# Patient Record
Sex: FEMALE | URBAN - METROPOLITAN AREA
[De-identification: names, ages, dates, MRNs, and addresses within clinical notes are randomized per-mention and may not be internally consistent; named-entity substitution may affect disease eponyms.]

---

## 2018-02-27 PROBLEM — Z00.00 ENCOUNTER FOR PREVENTIVE HEALTH EXAMINATION: Status: ACTIVE | Noted: 2018-02-27

## 2018-03-20 ENCOUNTER — OUTPATIENT (OUTPATIENT)
Dept: OUTPATIENT SERVICES | Facility: HOSPITAL | Age: 28
LOS: 1 days | Discharge: HOME | End: 2018-03-20

## 2018-03-20 ENCOUNTER — APPOINTMENT (OUTPATIENT)
Dept: OBGYN | Facility: CLINIC | Age: 28
End: 2018-03-20
Payer: COMMERCIAL

## 2018-03-20 VITALS — HEIGHT: 63 IN | BODY MASS INDEX: 26.58 KG/M2 | WEIGHT: 150 LBS

## 2018-03-20 DIAGNOSIS — N94.6 DYSMENORRHEA, UNSPECIFIED: ICD-10-CM

## 2018-03-20 PROCEDURE — 81003 URINALYSIS AUTO W/O SCOPE: CPT | Mod: QW

## 2018-03-20 PROCEDURE — 99214 OFFICE O/P EST MOD 30 MIN: CPT

## 2018-03-21 LAB
BILIRUB UR QL STRIP: NORMAL
GLUCOSE UR-MCNC: NORMAL
HCG UR QL: NORMAL EU/DL
HGB UR QL STRIP.AUTO: NORMAL
KETONES UR-MCNC: NORMAL
LEUKOCYTE ESTERASE UR QL STRIP: NORMAL
NITRITE UR QL STRIP: NORMAL
PH UR STRIP: 7
PROT UR STRIP-MCNC: NORMAL
SP GR UR STRIP: 1

## 2018-03-26 DIAGNOSIS — R10.2 PELVIC AND PERINEAL PAIN: ICD-10-CM

## 2018-04-05 ENCOUNTER — APPOINTMENT (OUTPATIENT)
Dept: OBGYN | Facility: CLINIC | Age: 28
End: 2018-04-05

## 2018-10-24 ENCOUNTER — OUTPATIENT (OUTPATIENT)
Dept: OUTPATIENT SERVICES | Facility: HOSPITAL | Age: 28
LOS: 1 days | Discharge: HOME | End: 2018-10-24

## 2018-10-24 ENCOUNTER — APPOINTMENT (OUTPATIENT)
Dept: OBGYN | Facility: CLINIC | Age: 28
End: 2018-10-24
Payer: COMMERCIAL

## 2018-10-24 VITALS — SYSTOLIC BLOOD PRESSURE: 110 MMHG | BODY MASS INDEX: 26.04 KG/M2 | DIASTOLIC BLOOD PRESSURE: 80 MMHG | WEIGHT: 147 LBS

## 2018-10-24 DIAGNOSIS — Z20.2 CONTACT WITH AND (SUSPECTED) EXPOSURE TO INFECTIONS WITH A PREDOMINANTLY SEXUAL MODE OF TRANSMISSION: ICD-10-CM

## 2018-10-24 DIAGNOSIS — N76.0 ACUTE VAGINITIS: ICD-10-CM

## 2018-10-24 PROCEDURE — 99213 OFFICE O/P EST LOW 20 MIN: CPT

## 2018-10-24 PROCEDURE — 87075 CULTR BACTERIA EXCEPT BLOOD: CPT

## 2018-10-30 LAB
A VAGINAE DNA VAG QL NAA+PROBE: ABNORMAL
BVAB2 DNA VAG QL NAA+PROBE: ABNORMAL
C KRUSEI DNA VAG QL NAA+PROBE: NEGATIVE
C TRACH RRNA SPEC QL NAA+PROBE: NEGATIVE
MEGA1 DNA VAG QL NAA+PROBE: ABNORMAL
N GONORRHOEA RRNA SPEC QL NAA+PROBE: NEGATIVE
T VAGINALIS RRNA SPEC QL NAA+PROBE: NEGATIVE

## 2018-11-11 ENCOUNTER — EMERGENCY (EMERGENCY)
Facility: HOSPITAL | Age: 28
LOS: 0 days | Discharge: HOME | End: 2018-11-11
Attending: EMERGENCY MEDICINE

## 2018-11-11 VITALS
RESPIRATION RATE: 18 BRPM | OXYGEN SATURATION: 100 % | DIASTOLIC BLOOD PRESSURE: 81 MMHG | WEIGHT: 149.91 LBS | SYSTOLIC BLOOD PRESSURE: 144 MMHG | HEIGHT: 63 IN | HEART RATE: 96 BPM | TEMPERATURE: 98 F

## 2018-11-11 DIAGNOSIS — R11.0 NAUSEA: ICD-10-CM

## 2018-11-11 DIAGNOSIS — R10.9 UNSPECIFIED ABDOMINAL PAIN: ICD-10-CM

## 2018-11-11 DIAGNOSIS — N83.209 UNSPECIFIED OVARIAN CYST, UNSPECIFIED SIDE: ICD-10-CM

## 2018-11-12 VITALS
RESPIRATION RATE: 20 BRPM | HEART RATE: 79 BPM | DIASTOLIC BLOOD PRESSURE: 65 MMHG | TEMPERATURE: 97 F | SYSTOLIC BLOOD PRESSURE: 117 MMHG | OXYGEN SATURATION: 100 %

## 2018-11-12 LAB
ALBUMIN SERPL ELPH-MCNC: 4.1 G/DL — SIGNIFICANT CHANGE UP (ref 3.5–5.2)
ALP SERPL-CCNC: 60 U/L — SIGNIFICANT CHANGE UP (ref 30–115)
ALT FLD-CCNC: 8 U/L — SIGNIFICANT CHANGE UP (ref 0–41)
ANION GAP SERPL CALC-SCNC: 18 MMOL/L — HIGH (ref 7–14)
APPEARANCE UR: ABNORMAL
AST SERPL-CCNC: 16 U/L — SIGNIFICANT CHANGE UP (ref 0–41)
BASOPHILS # BLD AUTO: 0.04 K/UL — SIGNIFICANT CHANGE UP (ref 0–0.2)
BASOPHILS NFR BLD AUTO: 0.3 % — SIGNIFICANT CHANGE UP (ref 0–1)
BILIRUB DIRECT SERPL-MCNC: <0.2 MG/DL — SIGNIFICANT CHANGE UP (ref 0–0.2)
BILIRUB INDIRECT FLD-MCNC: >0.3 MG/DL — SIGNIFICANT CHANGE UP (ref 0.2–1.2)
BILIRUB SERPL-MCNC: 0.5 MG/DL — SIGNIFICANT CHANGE UP (ref 0.2–1.2)
BILIRUB UR-MCNC: NEGATIVE — SIGNIFICANT CHANGE UP
BUN SERPL-MCNC: 11 MG/DL — SIGNIFICANT CHANGE UP (ref 10–20)
CALCIUM SERPL-MCNC: 9.8 MG/DL — SIGNIFICANT CHANGE UP (ref 8.5–10.1)
CHLORIDE SERPL-SCNC: 100 MMOL/L — SIGNIFICANT CHANGE UP (ref 98–110)
CO2 SERPL-SCNC: 21 MMOL/L — SIGNIFICANT CHANGE UP (ref 17–32)
COLOR SPEC: YELLOW — SIGNIFICANT CHANGE UP
CREAT SERPL-MCNC: 0.7 MG/DL — SIGNIFICANT CHANGE UP (ref 0.7–1.5)
DIFF PNL FLD: NEGATIVE — SIGNIFICANT CHANGE UP
EOSINOPHIL # BLD AUTO: 0.11 K/UL — SIGNIFICANT CHANGE UP (ref 0–0.7)
EOSINOPHIL NFR BLD AUTO: 0.9 % — SIGNIFICANT CHANGE UP (ref 0–8)
EPI CELLS # UR: ABNORMAL /HPF
GLUCOSE SERPL-MCNC: 100 MG/DL — HIGH (ref 70–99)
GLUCOSE UR QL: NEGATIVE MG/DL — SIGNIFICANT CHANGE UP
HCG SERPL QL: NEGATIVE — SIGNIFICANT CHANGE UP
HCT VFR BLD CALC: 37.3 % — SIGNIFICANT CHANGE UP (ref 37–47)
HGB BLD-MCNC: 12 G/DL — SIGNIFICANT CHANGE UP (ref 12–16)
IMM GRANULOCYTES NFR BLD AUTO: 0.3 % — SIGNIFICANT CHANGE UP (ref 0.1–0.3)
KETONES UR-MCNC: 40
LEUKOCYTE ESTERASE UR-ACNC: NEGATIVE — SIGNIFICANT CHANGE UP
LIDOCAIN IGE QN: 19 U/L — SIGNIFICANT CHANGE UP (ref 7–60)
LYMPHOCYTES # BLD AUTO: 1.8 K/UL — SIGNIFICANT CHANGE UP (ref 1.2–3.4)
LYMPHOCYTES # BLD AUTO: 14.7 % — LOW (ref 20.5–51.1)
MCHC RBC-ENTMCNC: 26.6 PG — LOW (ref 27–31)
MCHC RBC-ENTMCNC: 32.2 G/DL — SIGNIFICANT CHANGE UP (ref 32–37)
MCV RBC AUTO: 82.7 FL — SIGNIFICANT CHANGE UP (ref 81–99)
MONOCYTES # BLD AUTO: 0.45 K/UL — SIGNIFICANT CHANGE UP (ref 0.1–0.6)
MONOCYTES NFR BLD AUTO: 3.7 % — SIGNIFICANT CHANGE UP (ref 1.7–9.3)
NEUTROPHILS # BLD AUTO: 9.82 K/UL — HIGH (ref 1.4–6.5)
NEUTROPHILS NFR BLD AUTO: 80.1 % — HIGH (ref 42.2–75.2)
NITRITE UR-MCNC: NEGATIVE — SIGNIFICANT CHANGE UP
NRBC # BLD: 0 /100 WBCS — SIGNIFICANT CHANGE UP (ref 0–0)
PH UR: 5.5 — SIGNIFICANT CHANGE UP (ref 5–8)
PLATELET # BLD AUTO: 244 K/UL — SIGNIFICANT CHANGE UP (ref 130–400)
POTASSIUM SERPL-MCNC: 4.3 MMOL/L — SIGNIFICANT CHANGE UP (ref 3.5–5)
POTASSIUM SERPL-SCNC: 4.3 MMOL/L — SIGNIFICANT CHANGE UP (ref 3.5–5)
PROT SERPL-MCNC: 7.6 G/DL — SIGNIFICANT CHANGE UP (ref 6–8)
PROT UR-MCNC: NEGATIVE MG/DL — SIGNIFICANT CHANGE UP
RBC # BLD: 4.51 M/UL — SIGNIFICANT CHANGE UP (ref 4.2–5.4)
RBC # FLD: 13.9 % — SIGNIFICANT CHANGE UP (ref 11.5–14.5)
SODIUM SERPL-SCNC: 139 MMOL/L — SIGNIFICANT CHANGE UP (ref 135–146)
SP GR SPEC: 1.02 — SIGNIFICANT CHANGE UP (ref 1.01–1.03)
UROBILINOGEN FLD QL: 0.2 MG/DL — SIGNIFICANT CHANGE UP (ref 0.2–0.2)
WBC # BLD: 12.26 K/UL — HIGH (ref 4.8–10.8)
WBC # FLD AUTO: 12.26 K/UL — HIGH (ref 4.8–10.8)

## 2018-11-12 RX ORDER — MORPHINE SULFATE 50 MG/1
4 CAPSULE, EXTENDED RELEASE ORAL ONCE
Qty: 0 | Refills: 0 | Status: DISCONTINUED | OUTPATIENT
Start: 2018-11-12 | End: 2018-11-12

## 2018-11-12 RX ORDER — SODIUM CHLORIDE 9 MG/ML
1000 INJECTION INTRAMUSCULAR; INTRAVENOUS; SUBCUTANEOUS ONCE
Qty: 0 | Refills: 0 | Status: COMPLETED | OUTPATIENT
Start: 2018-11-12 | End: 2018-11-12

## 2018-11-12 RX ORDER — NORETHINDRONE 0.35 MG/1
1 TABLET ORAL
Qty: 14 | Refills: 0
Start: 2018-11-12 | End: 2018-11-25

## 2018-11-12 RX ORDER — IOHEXOL 300 MG/ML
30 INJECTION, SOLUTION INTRAVENOUS ONCE
Qty: 0 | Refills: 0 | Status: COMPLETED | OUTPATIENT
Start: 2018-11-12 | End: 2018-11-12

## 2018-11-12 RX ADMIN — SODIUM CHLORIDE 1000 MILLILITER(S): 9 INJECTION INTRAMUSCULAR; INTRAVENOUS; SUBCUTANEOUS at 01:05

## 2018-11-12 RX ADMIN — MORPHINE SULFATE 4 MILLIGRAM(S): 50 CAPSULE, EXTENDED RELEASE ORAL at 02:05

## 2018-11-12 RX ADMIN — SODIUM CHLORIDE 1000 MILLILITER(S): 9 INJECTION INTRAMUSCULAR; INTRAVENOUS; SUBCUTANEOUS at 02:05

## 2018-11-12 RX ADMIN — IOHEXOL 30 MILLILITER(S): 300 INJECTION, SOLUTION INTRAVENOUS at 03:13

## 2018-11-12 RX ADMIN — MORPHINE SULFATE 4 MILLIGRAM(S): 50 CAPSULE, EXTENDED RELEASE ORAL at 05:39

## 2018-11-12 NOTE — ED PROVIDER NOTE - OBJECTIVE STATEMENT
patient is c/o abdominal pain, generalized lower abd pain since 9.30 pm, associated with nausea, denies any f/c; denies any urinary symptoms, no vaginal bleeding, no vaginal d/c, patient with h/o endometriosis, sexually active, last sexual intercourse was 3 wks ago, LMP one week ago.

## 2018-11-12 NOTE — CONSULT NOTE ADULT - ASSESSMENT
26 yo G0, fundal fibroid, small ovarian cysts, presumed endometriosis with LLQ pain now resolved, unlikely gyn in origin.  recc percocet PRN(pt unable to take NSAIDs due to allergic reaction)  aygestin 5mg qD  follow up with Dr. Richards as scheduled  disposition per ED    Will inform Attending on call 26 yo G0, fundal fibroid, small ovarian cysts, presumed endometriosis with LLQ pain now resolved, unlikely gyn in origin.  recc percocet PRN(pt unable to take NSAIDs due to allergic reaction)  recc aygestin 5mg qD  follow up with Dr. Richards as scheduled  disposition per ED    Will inform Attending on call

## 2018-11-12 NOTE — ED ADULT NURSE NOTE - NSIMPLEMENTINTERV_GEN_ALL_ED
Implemented All Universal Safety Interventions:  [place] to call system. Call bell, personal items and telephone within reach. Instruct patient to call for assistance. Room bathroom lighting operational. Non-slip footwear when patient is off stretcher. Physically safe environment: no spills, clutter or unnecessary equipment. Stretcher in lowest position, wheels locked, appropriate side rails in place.

## 2018-11-12 NOTE — ED PROVIDER NOTE - PROGRESS NOTE DETAILS
patient remained stable in ED, improved well. Patient states that her pain is much better, states is feeling lot better. Patient was evaluated by OBGYN, as recommended, prescription is sent to pharmacy. Discussed with patient about the OBGYN recommendations of pain medications, patient preferred to take tylenol for pain, and will f/u with her OBGYN doctor. Patient states is feeling lot better, states is hungry and want to eat and want to go home. Patient is awake, alert, o x 3, ambulatory comfortable, tolerated PO. Discussed with patient in detail about his clinical condition, results of the diagnostic studies and the need for close out-patient follow up. Detail aftercare instructions and return precautions are given.

## 2018-11-12 NOTE — ED ADULT NURSE NOTE - CHPI ED NUR SYMPTOMS NEG
no blood in stool/no burning urination/no chills/no abdominal distension/no diarrhea/no dysuria/no hematuria/no nausea/no vomiting

## 2018-11-12 NOTE — CONSULT NOTE ADULT - SUBJECTIVE AND OBJECTIVE BOX
ROMEO ESPINAL  27y  Female [id number]    HPI: 28 yo G0, LMP 10/5 presents to ED with worsening lower abdominal pain starting yesterday at 2145. Reports that pain is mostly on the left, sharp, constant, 9/10 in intensity initially, lasted for 2.5 hours. Received morphine x 2 doses. Pain is now 2/10 in intensity. Denies fever/chills, nausea/vomiting, diarrhea/constipation, dysuria, vaginal discharge. Pt has known history of fibroid uterus and ovarian cysts. Pt also has presumed endometriosis and will have a diagnostic laparoscopy with her OBGYN Dr. Richards.     Her endometriosis pain is q23 days, starting a day or two prior to her period. Also has dyschezia and dyspareunia.     PAST MEDICAL & SURGICAL HISTORY:  none      OB/GYN HISTORY: h/o presumed endometriosis, regular periods, q month, ovarian cysts-conservative management, denies h/o STIs, abnormal pap smears                                          FAMILY HISTORY: colon ca in Grandmother    Allergies: NSAID induced asthma      SOCIAL HISTORY: denies    REVIEW OF SYSTEMS  Denies the following: constitutional symptoms, visual symptoms, cardiovascular symptoms, respiratory symptoms, GI symptoms, musculoskeletal symptoms, skin symptoms, neurologic symptoms, hematologic symptoms, allergic symptoms, psychiatric symptoms  Except any pertinent positives listed.     Vital Signs Last 24 Hrs  T(C): 36.7 (2018 23:31), Max: 36.7 (2018 23:31)  T(F): 98.1 (2018 23:31), Max: 98.1 (2018 23:31)  HR: 96 (2018 23:31) (96 - 96)  BP: 144/81 (2018 23:31) (144/81 - 144/81)  BP(mean): --  RR: 18 (2018 23:31) (18 - 18)  SpO2: 100% (2018 23:31) (100% - 100%)      PHYSICAL EXAM:  GEN: NAD  Heart:RRR  Lungs:CTAB  ABd: NT, soft, no r/g/r  SVE: no CMT, retroverted uterus, nontender, no adnexal tenderness or masses b/l      LABS:    Pregnancy Test: negative as per Dr. Hsieh                        12.0    )-----------( 244      ( 2018 00:13 )             37.3         139  |  100  |  11  ----------------------------<  100<H>  4.3   |  21  |  0.7    Ca    9.8      2018 00:13    TPro  7.6  /  Alb  4.1  /  TBili  0.5  /  DBili  <0.2  /  AST  16  /  ALT  8   /  AlkPhos  60      I&O's Detail      Urinalysis Basic - ( 2018 00:19 )    Color: Yellow / Appearance: Cloudy / S.025 / pH: x  Gluc: x / Ketone: 40  / Bili: Negative / Urobili: 0.2 mg/dL   Blood: x / Protein: Negative mg/dL / Nitrite: Negative   Leuk Esterase: Negative / RBC: x / WBC x   Sq Epi: x / Non Sq Epi: Many /HPF / Bacteria: x      RADIOLOGY & ADDITIONAL STUDIES:  < from: CT Abdomen and Pelvis w/ Oral Cont and w/ IV Cont (18 @ 05:20) >    LOWER CHEST: Bibasilar subsegmental atelectasis..    HEPATOBILIARY: Scattered coarse hepatic calcifications, unchanged. No   intrahepatic or extrahepatic biliary ductal dilation.    SPLEEN: Unremarkable.    PANCREAS: Unremarkable.    ADRENAL GLANDS: Unremarkable.    KIDNEYS: Symmetric renal enhancement. No hydronephrosis.    ABDOMINOPELVIC NODES: Unremarkable.    PELVIC ORGANS: Retroverted uterus. Small uterine fibroids, better   evaluated on recent ultrasound.    PERITONEUM/MESENTERY/BOWEL: Sigmoid colon diverticulosis. The appendix is   not discretely visualized, however there are no right lower quadrant   inflammatory changes. No bowel obstruction. No pneumoperitoneum or   ascites.    BONES/SOFT TISSUES: Unremarkable.    OTHER: Normal caliber aorta.      IMPRESSION:       No acute intra-abdominal pathology.    < end of copied text >    < from: US Pelvis Complete (18 @ 01:14) >  LMP:   2018     FINDINGS:    UTERUS: Anteverted measuring 11 x 3.9 x 6.4 cm with an exophytic   broad-based predominantly subserosal fibroid arising from the dome. It   measures approximately 4.2 x 3.4 x 4.4 cm. The endometrial echo complex   measures 0.6 cm, which is normal in thickness.     RIGHT OVARY: measures 3.2 x 2.8 x 2.4 cm, and contains several cysts   measuring up to 2.1 cm. Doppler flow is demonstrated to the right ovary.     LEFT OVARY: measures 3.3 x 1.5 x 1.4 cm, andcontains a complicated cyst   measuring up to 1.8 cm. Doppler flow is demonstrated to the left ovary.     OTHER: No free fluid in the pelvis.    IMPRESSION:    Bilateral ovarian cysts/dominant follicles measuring up to 2.1 cm.    Fibroid uterus        < end of copied text > ROMEO ESPINAL  27y  Female [id number]    HPI: 26 yo G0, LMP 11/5 presents to ED with worsening lower abdominal pain starting yesterday at 2145. Reports that pain is mostly on the left, sharp, constant, 9/10 in intensity initially, lasted for 2.5 hours. Received morphine x 2 doses. Pain is now 2/10 in intensity. Denies fever/chills, nausea/vomiting, diarrhea/constipation, dysuria, vaginal discharge. Pt has known history of fibroid uterus and ovarian cysts. Pt also has presumed endometriosis and will have a diagnostic laparoscopy with her OBGYN Dr. Richards.     Her endometriosis pain is q23 days, starting a day or two prior to her period. Also has dyschezia and dyspareunia.     PAST MEDICAL & SURGICAL HISTORY:  none      OB/GYN HISTORY: h/o presumed endometriosis, regular periods, q month, ovarian cysts-conservative management, denies h/o STIs, abnormal pap smears                                          FAMILY HISTORY: colon ca in Grandmother    Allergies: NSAID induced asthma      SOCIAL HISTORY: denies    REVIEW OF SYSTEMS  Denies the following: constitutional symptoms, visual symptoms, cardiovascular symptoms, respiratory symptoms, GI symptoms, musculoskeletal symptoms, skin symptoms, neurologic symptoms, hematologic symptoms, allergic symptoms, psychiatric symptoms  Except any pertinent positives listed.     Vital Signs Last 24 Hrs  T(C): 36.7 (2018 23:31), Max: 36.7 (2018 23:31)  T(F): 98.1 (2018 23:31), Max: 98.1 (2018 23:31)  HR: 96 (2018 23:31) (96 - 96)  BP: 144/81 (2018 23:31) (144/81 - 144/81)  BP(mean): --  RR: 18 (2018 23:31) (18 - 18)  SpO2: 100% (2018 23:31) (100% - 100%)      PHYSICAL EXAM:  GEN: NAD  Heart:RRR  Lungs:CTAB  ABd: NT, soft, no r/g/r  SVE: no CMT, retroverted uterus, nontender, no adnexal tenderness or masses b/l      LABS:    Pregnancy Test: negative as per Dr. Hsieh                        12.0    )-----------( 244      ( 2018 00:13 )             37.3         139  |  100  |  11  ----------------------------<  100<H>  4.3   |  21  |  0.7    Ca    9.8      2018 00:13    TPro  7.6  /  Alb  4.1  /  TBili  0.5  /  DBili  <0.2  /  AST  16  /  ALT  8   /  AlkPhos  60      I&O's Detail      Urinalysis Basic - ( 2018 00:19 )    Color: Yellow / Appearance: Cloudy / S.025 / pH: x  Gluc: x / Ketone: 40  / Bili: Negative / Urobili: 0.2 mg/dL   Blood: x / Protein: Negative mg/dL / Nitrite: Negative   Leuk Esterase: Negative / RBC: x / WBC x   Sq Epi: x / Non Sq Epi: Many /HPF / Bacteria: x      RADIOLOGY & ADDITIONAL STUDIES:  < from: CT Abdomen and Pelvis w/ Oral Cont and w/ IV Cont (18 @ 05:20) >    LOWER CHEST: Bibasilar subsegmental atelectasis..    HEPATOBILIARY: Scattered coarse hepatic calcifications, unchanged. No   intrahepatic or extrahepatic biliary ductal dilation.    SPLEEN: Unremarkable.    PANCREAS: Unremarkable.    ADRENAL GLANDS: Unremarkable.    KIDNEYS: Symmetric renal enhancement. No hydronephrosis.    ABDOMINOPELVIC NODES: Unremarkable.    PELVIC ORGANS: Retroverted uterus. Small uterine fibroids, better   evaluated on recent ultrasound.    PERITONEUM/MESENTERY/BOWEL: Sigmoid colon diverticulosis. The appendix is   not discretely visualized, however there are no right lower quadrant   inflammatory changes. No bowel obstruction. No pneumoperitoneum or   ascites.    BONES/SOFT TISSUES: Unremarkable.    OTHER: Normal caliber aorta.      IMPRESSION:       No acute intra-abdominal pathology.    < end of copied text >    < from: US Pelvis Complete (18 @ 01:14) >  LMP:   2018     FINDINGS:    UTERUS: Anteverted measuring 11 x 3.9 x 6.4 cm with an exophytic   broad-based predominantly subserosal fibroid arising from the dome. It   measures approximately 4.2 x 3.4 x 4.4 cm. The endometrial echo complex   measures 0.6 cm, which is normal in thickness.     RIGHT OVARY: measures 3.2 x 2.8 x 2.4 cm, and contains several cysts   measuring up to 2.1 cm. Doppler flow is demonstrated to the right ovary.     LEFT OVARY: measures 3.3 x 1.5 x 1.4 cm, andcontains a complicated cyst   measuring up to 1.8 cm. Doppler flow is demonstrated to the left ovary.     OTHER: No free fluid in the pelvis.    IMPRESSION:    Bilateral ovarian cysts/dominant follicles measuring up to 2.1 cm.    Fibroid uterus        < end of copied text >

## 2018-11-13 LAB
CULTURE RESULTS: NO GROWTH — SIGNIFICANT CHANGE UP
SPECIMEN SOURCE: SIGNIFICANT CHANGE UP

## 2018-12-13 ENCOUNTER — OUTPATIENT (OUTPATIENT)
Dept: OUTPATIENT SERVICES | Facility: HOSPITAL | Age: 28
LOS: 1 days | Discharge: HOME | End: 2018-12-13

## 2018-12-13 ENCOUNTER — APPOINTMENT (OUTPATIENT)
Dept: OBGYN | Facility: CLINIC | Age: 28
End: 2018-12-13
Payer: COMMERCIAL

## 2018-12-13 VITALS — WEIGHT: 144 LBS | SYSTOLIC BLOOD PRESSURE: 100 MMHG | DIASTOLIC BLOOD PRESSURE: 70 MMHG | BODY MASS INDEX: 25.51 KG/M2

## 2018-12-13 DIAGNOSIS — J45.909 UNSPECIFIED ASTHMA, UNCOMPLICATED: ICD-10-CM

## 2018-12-13 DIAGNOSIS — N89.8 OTHER SPECIFIED NONINFLAMMATORY DISORDERS OF VAGINA: ICD-10-CM

## 2018-12-13 DIAGNOSIS — R10.2 PELVIC AND PERINEAL PAIN: ICD-10-CM

## 2018-12-13 LAB
BILIRUB UR QL STRIP: NORMAL
GLUCOSE UR-MCNC: NORMAL
HCG UR QL: 0.2 EU/DL
HGB UR QL STRIP.AUTO: NORMAL
KETONES UR-MCNC: NORMAL
LEUKOCYTE ESTERASE UR QL STRIP: NORMAL
NITRITE UR QL STRIP: NORMAL
PH UR STRIP: 7
PROT UR STRIP-MCNC: NORMAL
SP GR UR STRIP: 1.02

## 2018-12-13 PROCEDURE — 81003 URINALYSIS AUTO W/O SCOPE: CPT | Mod: QW

## 2018-12-13 PROCEDURE — 99213 OFFICE O/P EST LOW 20 MIN: CPT | Mod: 25

## 2018-12-13 PROCEDURE — 87075 CULTR BACTERIA EXCEPT BLOOD: CPT

## 2018-12-24 ENCOUNTER — RESULT REVIEW (OUTPATIENT)
Age: 28
End: 2018-12-24

## 2018-12-24 LAB
A VAGINAE DNA VAG QL NAA+PROBE: ABNORMAL
BVAB2 DNA VAG QL NAA+PROBE: ABNORMAL
C KRUSEI DNA VAG QL NAA+PROBE: NEGATIVE
C KRUSEI DNA VAG QL NAA+PROBE: POSITIVE
C TRACH RRNA SPEC QL NAA+PROBE: NEGATIVE
MEGA1 DNA VAG QL NAA+PROBE: NORMAL
N GONORRHOEA RRNA SPEC QL NAA+PROBE: NEGATIVE
T VAGINALIS RRNA SPEC QL NAA+PROBE: NEGATIVE

## 2018-12-26 ENCOUNTER — CLINICAL ADVICE (OUTPATIENT)
Age: 28
End: 2018-12-26

## 2019-05-06 NOTE — ED ADULT NURSE NOTE - CHIEF COMPLAINT QUOTE
I have bad abdominal pain and I just finished my period. I might have endometriosis but today is was worse than usual - pt
03-May-2019

## 2019-05-13 ENCOUNTER — APPOINTMENT (OUTPATIENT)
Dept: OBGYN | Facility: CLINIC | Age: 29
End: 2019-05-13

## 2019-09-09 ENCOUNTER — EMERGENCY (EMERGENCY)
Facility: HOSPITAL | Age: 29
LOS: 0 days | Discharge: HOME | End: 2019-09-09
Attending: EMERGENCY MEDICINE | Admitting: EMERGENCY MEDICINE
Payer: COMMERCIAL

## 2019-09-09 VITALS
DIASTOLIC BLOOD PRESSURE: 84 MMHG | HEART RATE: 105 BPM | RESPIRATION RATE: 18 BRPM | OXYGEN SATURATION: 100 % | TEMPERATURE: 96 F | HEIGHT: 63 IN | WEIGHT: 145.06 LBS | SYSTOLIC BLOOD PRESSURE: 130 MMHG

## 2019-09-09 DIAGNOSIS — Z79.899 OTHER LONG TERM (CURRENT) DRUG THERAPY: ICD-10-CM

## 2019-09-09 DIAGNOSIS — J45.901 UNSPECIFIED ASTHMA WITH (ACUTE) EXACERBATION: ICD-10-CM

## 2019-09-09 DIAGNOSIS — Z79.52 LONG TERM (CURRENT) USE OF SYSTEMIC STEROIDS: ICD-10-CM

## 2019-09-09 DIAGNOSIS — R07.1 CHEST PAIN ON BREATHING: ICD-10-CM

## 2019-09-09 LAB
ALBUMIN SERPL ELPH-MCNC: 4.6 G/DL — SIGNIFICANT CHANGE UP (ref 3.5–5.2)
ALP SERPL-CCNC: 59 U/L — SIGNIFICANT CHANGE UP (ref 30–115)
ALT FLD-CCNC: 7 U/L — SIGNIFICANT CHANGE UP (ref 0–41)
ANION GAP SERPL CALC-SCNC: 14 MMOL/L — SIGNIFICANT CHANGE UP (ref 7–14)
AST SERPL-CCNC: 13 U/L — SIGNIFICANT CHANGE UP (ref 0–41)
BILIRUB SERPL-MCNC: 0.6 MG/DL — SIGNIFICANT CHANGE UP (ref 0.2–1.2)
BUN SERPL-MCNC: 10 MG/DL — SIGNIFICANT CHANGE UP (ref 10–20)
CALCIUM SERPL-MCNC: 9.6 MG/DL — SIGNIFICANT CHANGE UP (ref 8.5–10.1)
CHLORIDE SERPL-SCNC: 101 MMOL/L — SIGNIFICANT CHANGE UP (ref 98–110)
CO2 SERPL-SCNC: 23 MMOL/L — SIGNIFICANT CHANGE UP (ref 17–32)
CREAT SERPL-MCNC: 0.8 MG/DL — SIGNIFICANT CHANGE UP (ref 0.7–1.5)
D DIMER BLD IA.RAPID-MCNC: 96 NG/ML DDU — SIGNIFICANT CHANGE UP (ref 0–230)
GLUCOSE SERPL-MCNC: 105 MG/DL — HIGH (ref 70–99)
HCT VFR BLD CALC: 39.2 % — SIGNIFICANT CHANGE UP (ref 37–47)
HGB BLD-MCNC: 12.9 G/DL — SIGNIFICANT CHANGE UP (ref 12–16)
MCHC RBC-ENTMCNC: 27.8 PG — SIGNIFICANT CHANGE UP (ref 27–31)
MCHC RBC-ENTMCNC: 32.9 G/DL — SIGNIFICANT CHANGE UP (ref 32–37)
MCV RBC AUTO: 84.5 FL — SIGNIFICANT CHANGE UP (ref 81–99)
NRBC # BLD: 0 /100 WBCS — SIGNIFICANT CHANGE UP (ref 0–0)
PLATELET # BLD AUTO: 229 K/UL — SIGNIFICANT CHANGE UP (ref 130–400)
POTASSIUM SERPL-MCNC: 3.4 MMOL/L — LOW (ref 3.5–5)
POTASSIUM SERPL-SCNC: 3.4 MMOL/L — LOW (ref 3.5–5)
PROT SERPL-MCNC: 7.7 G/DL — SIGNIFICANT CHANGE UP (ref 6–8)
RBC # BLD: 4.64 M/UL — SIGNIFICANT CHANGE UP (ref 4.2–5.4)
RBC # FLD: 13 % — SIGNIFICANT CHANGE UP (ref 11.5–14.5)
SODIUM SERPL-SCNC: 138 MMOL/L — SIGNIFICANT CHANGE UP (ref 135–146)
WBC # BLD: 13.87 K/UL — HIGH (ref 4.8–10.8)
WBC # FLD AUTO: 13.87 K/UL — HIGH (ref 4.8–10.8)

## 2019-09-09 PROCEDURE — 71046 X-RAY EXAM CHEST 2 VIEWS: CPT | Mod: 26

## 2019-09-09 PROCEDURE — 99285 EMERGENCY DEPT VISIT HI MDM: CPT

## 2019-09-09 PROCEDURE — 93010 ELECTROCARDIOGRAM REPORT: CPT

## 2019-09-09 RX ORDER — IPRATROPIUM/ALBUTEROL SULFATE 18-103MCG
3 AEROSOL WITH ADAPTER (GRAM) INHALATION
Refills: 0 | Status: COMPLETED | OUTPATIENT
Start: 2019-09-09 | End: 2019-09-09

## 2019-09-09 RX ORDER — IPRATROPIUM/ALBUTEROL SULFATE 18-103MCG
0 AEROSOL WITH ADAPTER (GRAM) INHALATION
Qty: 0 | Refills: 0 | DISCHARGE

## 2019-09-09 RX ADMIN — Medication 3 MILLILITER(S): at 21:59

## 2019-09-09 RX ADMIN — Medication 3 MILLILITER(S): at 22:13

## 2019-09-09 RX ADMIN — Medication 50 MILLIGRAM(S): at 22:30

## 2019-09-09 RX ADMIN — Medication 3 MILLILITER(S): at 22:21

## 2019-09-09 NOTE — ED PROVIDER NOTE - OBJECTIVE STATEMENT
28y F pmh asthma presenting with chest tightness, SOB consistent with asthma flare x2 weeks. +cough productive of sputum. Was prescribed azithromycin and began taking it yesterday. Got a steroid shot in urgent care a week ago. Has been giving herself duoneb treatments at home. Symptoms have not improved with aforementioned treatments. No f/c/n/v. No abdominal pain/GI symptoms.

## 2019-09-09 NOTE — ED PROVIDER NOTE - NSFOLLOWUPINSTRUCTIONS_ED_ALL_ED_FT
Asthma    Asthma is a recurring condition in which the airways tighten and narrow, making it difficult to breath. Asthma exacerbations, also called asthma attacks, range from minor to life-threatening. Symptoms include wheezing, coughing, chest tightness, or shortness of breath. The diagnosis of asthma is made by a review of your medical history and a physical exam, but may involve additional testing. Asthma cannot be cured, but medicines and lifestyle changes can help control it. Avoid triggers of asthma which may include animal dander, pollen, mold, smoke, air pollutants, etc.     SEEK IMMEDIATE MEDICAL CARE IF YOU HAVE THE FOLLOWING SYMPTOMS: worsening of symptoms, symptoms that do not respond to medication, shortness of breath at rest, chest pain, bluish discoloration to lips or fingertips, lightheadedness/dizziness, or fever.

## 2019-09-09 NOTE — ED PROVIDER NOTE - CARE PROVIDER_API CALL
Hiro Patterson (DO)  Critical Care Medicine; Pulmonary Disease; Sleep Medicine  [address] [place] [address], [place] [address]  Phone: [phone]  Fax: [phone]  Follow Up Time: 1-3 Days

## 2019-09-09 NOTE — ED PROVIDER NOTE - PHYSICAL EXAMINATION
CONSTITUTIONAL: Well-developed; well-nourished; in no acute distress.   SKIN: warm, dry  HEAD: Normocephalic; atraumatic.  EYES: no conjunctival injection. PERRL.   ENT: No nasal discharge; airway clear.  NECK: Supple; non tender.  CARD: S1, S2 normal;  RESP: No wheezes, rales or rhonchi.  ABD: soft ntnd  EXT: Normal ROM.  No clubbing, cyanosis or edema.   LYMPH: No acute cervical adenopathy.  NEURO: Alert, oriented, grossly unremarkable  PSYCH: Cooperative, appropriate.

## 2019-09-09 NOTE — ED PROVIDER NOTE - PATIENT PORTAL LINK FT
You can access the FollowMyHealth Patient Portal offered by [facility] by registering at the following website: [email or web address]. By joining [employer]’s FollowMyHealth portal, you will also be able to view your health information using other applications (apps) compatible with our system.

## 2019-09-09 NOTE — ED ADULT NURSE NOTE - OBJECTIVE STATEMENT
Patient complaining of cough, chest congestion and discomfort x2week. Was seen by PMD and urgent care for asthma exacerbation treatments without improvement. Patient does not feel relief with nebulizer treatments, prednisone or cough supplements

## 2019-09-09 NOTE — ED PROVIDER NOTE - NS ED ROS FT
Eyes:  No visual changes, eye pain or discharge.  ENMT:  No hearing changes, pain, no sore throat or runny nose, no difficulty swallowing  Cardiac: Pleuritic chest pain, chest tightness  Respiratory:  cough  GI:  No nausea, vomiting, diarrhea or abdominal pain.  :  No dysuria, frequency or burning.  MS:  No myalgia, muscle weakness, joint pain or back pain.  Neuro:  No headache or weakness.  No LOC.  Skin:  No skin rash.   Endocrine: No history of thyroid disease or diabetes.

## 2019-09-09 NOTE — ED PROVIDER NOTE - CLINICAL SUMMARY MEDICAL DECISION MAKING FREE TEXT BOX
evaluated for asthma exacerbation, found to have negative ddimer for pe rule out, treated with nebs and steroids and symptoms improved.

## 2019-09-09 NOTE — ED PROVIDER NOTE - PROGRESS NOTE DETAILS
ATTENDING NOTE: 27 y/o F with PMH of asthma presents with cough, SOB and chest tightness x2 weeks. No history of intubation or hospitalization. Pt takes albuterol twice daily. Pt went to [facility] for [facility] and was sent home with prescription for Azithromycin and a shot of steroids. ROS-As noted above, additionally: (+): SOB (+) cough (+) chest tightness (-): fever, chills, n/v, cp, palpitations, diaphoresis,  ha/dizziness, neck pain, abd pain, diarrhea, constipation, melena/brbpr, urinary symptoms, weakness, numbness/tingling, edema, calf pain/swelling/erythema, sick contacts, recent travel, trauma or rash. VS-I have reviewed the initial VS.  PE-As noted above, additionally. Gen:a&o, nad. Eyes:perrl, eomi. Ent:moist membranes, nl voice, (-) stridor. Neck:from, supple, trachea midline, (-) c-spine tender/step-offs/lymphadenopathy/meningismus. Cv: s1,s2, rrr, (-) murmur/JVD. Chest:ctab, speaking full sentences, (-) wheezing/rales/rhonchi/ retractions. Abd:soft, nl BS, (-)tender, (-)distended/guarding/rebound/CVA tenderness. Ext:FROM on all ext, (2+) pulses, (-) edema. Integumentary nl color for race, warm and dry, (-)rash. Neuro: Oriented x3, CN 2-12 grossly intact motor/sensory  LABS/IMAGING-  DISPO-given 3 duo nebs, prednisone and d/c home.  RX- MDM- 29 y/o F with cough, SOB and chest tightness. Vitals wnl. Physical- unremarkable. Previous records obtained and reviewed, noted to have _. EKG ordered, documented above. For imaging we ordered a CXR, and my preliminary read did not reveal, a ptx, infiltrates, or free air. Labs ordered and noted to be within normal limits. We treated the patient with _. We also consulted _. After the workup the decision was made admit/discharge the patient. Extensive discussion had with the patient/family. case s/o to  pending labs, dimer, and cxr. Currently pt getting albuterol tx and steroids REGINA BARROS: pt feels much improved. no wheezing on exam. CXR/EKG/ddimer/labs WNL. Reviewed all results and necessity for follow up. Counseled on red flags and to return for them.  Patient appears well on discharge.

## 2019-12-25 PROBLEM — R10.2 PELVIC PAIN IN FEMALE: Status: ACTIVE | Noted: 2018-03-20

## 2020-02-11 PROBLEM — N80.9 ENDOMETRIOSIS, UNSPECIFIED: Chronic | Status: ACTIVE | Noted: 2019-09-09

## 2020-02-11 PROBLEM — J45.909 UNSPECIFIED ASTHMA, UNCOMPLICATED: Chronic | Status: ACTIVE | Noted: 2019-09-09

## 2020-03-02 ENCOUNTER — APPOINTMENT (OUTPATIENT)
Dept: OBGYN | Facility: CLINIC | Age: 30
End: 2020-03-02
Payer: COMMERCIAL

## 2020-03-02 VITALS — SYSTOLIC BLOOD PRESSURE: 100 MMHG | WEIGHT: 152 LBS | BODY MASS INDEX: 26.93 KG/M2 | DIASTOLIC BLOOD PRESSURE: 60 MMHG

## 2020-03-02 DIAGNOSIS — Z84.0 FAMILY HISTORY OF DISEASES OF THE SKIN AND SUBCUTANEOUS TISSUE: ICD-10-CM

## 2020-03-02 LAB
BILIRUB UR QL STRIP: NORMAL
GLUCOSE UR-MCNC: NORMAL
HCG UR QL: 0.2 EU/DL
HGB UR QL STRIP.AUTO: NORMAL
KETONES UR-MCNC: 80
LEUKOCYTE ESTERASE UR QL STRIP: NORMAL
NITRITE UR QL STRIP: NORMAL
PH UR STRIP: 5.5
PROT UR STRIP-MCNC: NORMAL
SP GR UR STRIP: 1.02

## 2020-03-02 PROCEDURE — 81003 URINALYSIS AUTO W/O SCOPE: CPT | Mod: QW

## 2020-03-02 PROCEDURE — 58301 REMOVE INTRAUTERINE DEVICE: CPT

## 2020-03-02 PROCEDURE — 99395 PREV VISIT EST AGE 18-39: CPT

## 2020-03-02 RX ORDER — METRONIDAZOLE 7.5 MG/G
0.75 GEL VAGINAL
Qty: 1 | Refills: 0 | Status: COMPLETED | COMMUNITY
Start: 2018-10-24 | End: 2020-03-02

## 2020-03-02 RX ORDER — FLUCONAZOLE 150 MG/1
150 TABLET ORAL
Qty: 1 | Refills: 1 | Status: COMPLETED | COMMUNITY
Start: 2018-12-13 | End: 2020-03-02

## 2020-03-02 RX ORDER — NAPROXEN 500 MG/1
500 TABLET ORAL
Qty: 30 | Refills: 5 | Status: COMPLETED | COMMUNITY
Start: 2018-12-13 | End: 2020-03-02

## 2020-03-02 RX ORDER — METRONIDAZOLE 7.5 MG/G
0.75 GEL VAGINAL
Qty: 1 | Refills: 0 | Status: COMPLETED | COMMUNITY
Start: 2018-12-24 | End: 2020-03-02

## 2020-03-02 NOTE — PROCEDURE
[Cervical Pap Smear] : cervical Pap smear [Liquid Base] : liquid base [IUD Removal] : IUD [Mirena] : Mirena [Dysmenorrhea] : dysmenorrhea [Patient] : patient [Risks] : risks [Benefits] : benefits [Alternatives] : alternatives [Consent Obtained] : consent was obtained prior to the procedure and is detailed in the patient's record [Speculum Placed] : a speculum was placed in the vagina [Strings Visualized] : the IUD strings were visualized [IUD Removed - Forceps] : the strings were grasped with forceps and the IUD was removed [IUD Discarded] : discarded [Tolerated Well] : the patient tolerated the procedure well [No Complications] : none [Heavy Vaginal Bleeding] : for heavy vaginal bleeding [Pelvic Pain] : for pelvic pain

## 2020-03-02 NOTE — COUNSELING
[Breast Self Exam] : breast self exam [Exercise] : exercise [Vitamins/Supplements] : vitamins/supplements [STD (testing, results, tx)] : STD (testing, results, tx) [Contraception] : contraception [Safe Sexual Practices] : safe sexual practices

## 2020-06-29 ENCOUNTER — APPOINTMENT (OUTPATIENT)
Dept: OBGYN | Facility: CLINIC | Age: 30
End: 2020-06-29
Payer: COMMERCIAL

## 2020-06-29 PROCEDURE — 58300 INSERT INTRAUTERINE DEVICE: CPT

## 2020-06-29 RX ORDER — LEVONORGESTREL 19.5 MG/1
19.5 INTRAUTERINE DEVICE INTRAUTERINE
Refills: 0 | Status: ACTIVE | COMMUNITY

## 2020-06-29 NOTE — PROCEDURE
[IUD Placement] : intrauterine device (IUD) placement [Prevention of Pregnancy] : prevention of pregnancy [Risks] : risks [Benefits] : benefits [Alternatives] : alternatives [Patient] : patient [Bleeding] : bleeding [Infection] : infection [Pain] : pain [Expulsion] : expulsion [Uterine Perforation] : uterine perforation [Failure] : failure [CONSENT OBTAINED] : written consent was obtained prior to the procedure. [LMP ___] : LMP was [unfilled] [Betadine] : Prepped with Betadine [Neg Pregnancy Test] : a pregnancy test was negative [None] : none [Uterus Sounded to ___cm] : sounded to [unfilled]Ucm [Tenaculum] : a single toothed tenaculum [Lot Number: ___] : IUD lot number: [unfilled] [Easy Passage] : allowed easy passage of a uterine sound without dilation [Tolerated Well] : the patient tolerated the procedure well [No Complications] : there were no complications [de-identified] : Kyleena [de-identified] : endometriosis treatment

## 2020-08-11 ENCOUNTER — APPOINTMENT (OUTPATIENT)
Dept: OBGYN | Facility: CLINIC | Age: 30
End: 2020-08-11

## 2020-09-16 ENCOUNTER — APPOINTMENT (OUTPATIENT)
Dept: OBGYN | Facility: CLINIC | Age: 30
End: 2020-09-16
Payer: COMMERCIAL

## 2020-09-16 VITALS — TEMPERATURE: 97.7 F | BODY MASS INDEX: 27.81 KG/M2 | WEIGHT: 157 LBS

## 2020-09-16 DIAGNOSIS — D21.9 BENIGN NEOPLASM OF CONNECTIVE AND OTHER SOFT TISSUE, UNSPECIFIED: ICD-10-CM

## 2020-09-16 PROCEDURE — 99212 OFFICE O/P EST SF 10 MIN: CPT

## 2020-09-16 NOTE — PHYSICAL EXAM
[No Acute Distress] : no acute distress [Alert] : alert [Appropriately responsive] : appropriately responsive [Oriented x3] : oriented x3 [Normal] : normal [Uterine Adnexae] : normal [IUD String] : an IUD string was noted [FreeTextEntry8] : Uterus about 8 weeks in size, nontender. No CMT. No adnexal masses or tenderness

## 2020-09-16 NOTE — HISTORY OF PRESENT ILLNESS
[FreeTextEntry1] : 28yo who presents for contraception surveillance.\par \par Kyleena IUD inserted 6/2020 without complication.\par Experienced one menstrual cycle since insertion. Heavier than usual.\par Experienced pain flare up one week prior to menstrual cycle.\par \par Sexually active with partner of 6 months, has not been very active as seems to trigger pelvic pain \par Denies dyspareunia\par Contraception: Kyleena IUD, no condoms\par Denies history of STIs\par Denies vaginal discharge, irritation or odor \par \par History of fibroids -> seen on US and confirmed during laparoscopic surgery for endometriosis. Not removed at the time as would of required more invasive surgery.\par \par

## 2020-10-14 ENCOUNTER — APPOINTMENT (OUTPATIENT)
Dept: OBGYN | Facility: CLINIC | Age: 30
End: 2020-10-14
Payer: COMMERCIAL

## 2020-10-14 PROCEDURE — 76830 TRANSVAGINAL US NON-OB: CPT

## 2020-11-03 DIAGNOSIS — Z91.018 ALLERGY TO OTHER FOODS: ICD-10-CM

## 2020-11-03 DIAGNOSIS — J30.9 ALLERGIC RHINITIS, UNSPECIFIED: ICD-10-CM

## 2020-11-03 RX ORDER — MOMETASONE FUROATE 100 UG/1
100 AEROSOL RESPIRATORY (INHALATION)
Refills: 0 | Status: ACTIVE | COMMUNITY

## 2020-11-03 RX ORDER — CETIRIZINE HCL 10 MG
TABLET ORAL
Refills: 0 | Status: ACTIVE | COMMUNITY

## 2020-11-03 RX ORDER — INHALER, ASSIST DEVICES
SPACER (EA) MISCELLANEOUS
Refills: 0 | Status: ACTIVE | COMMUNITY

## 2020-11-03 RX ORDER — MULTIVITAMIN
TABLET ORAL
Refills: 0 | Status: ACTIVE | COMMUNITY

## 2020-11-03 RX ORDER — BACILLUS COAGULANS/INULIN 1B-250 MG
CAPSULE ORAL
Refills: 0 | Status: ACTIVE | COMMUNITY

## 2020-11-03 RX ORDER — ALBUTEROL SULFATE 90 UG/1
108 (90 BASE) AEROSOL, METERED RESPIRATORY (INHALATION)
Refills: 0 | Status: ACTIVE | COMMUNITY

## 2020-12-14 ENCOUNTER — NON-APPOINTMENT (OUTPATIENT)
Age: 30
End: 2020-12-14

## 2020-12-14 ENCOUNTER — APPOINTMENT (OUTPATIENT)
Dept: OBGYN | Facility: CLINIC | Age: 30
End: 2020-12-14
Payer: COMMERCIAL

## 2020-12-14 VITALS — TEMPERATURE: 97.6 F

## 2020-12-14 PROCEDURE — 99072 ADDL SUPL MATRL&STAF TM PHE: CPT

## 2020-12-14 PROCEDURE — 99213 OFFICE O/P EST LOW 20 MIN: CPT

## 2020-12-14 PROCEDURE — 87075 CULTR BACTERIA EXCEPT BLOOD: CPT

## 2020-12-14 NOTE — PHYSICAL EXAM
[Awake] : awake [Alert] : alert [Soft] : soft [Oriented x3] : oriented to person, place, and time [Normal] : uterus [IUD String] : had an IUD string protruding out [Uterine Adnexae] : were not tender and not enlarged [Acute Distress] : no acute distress [Tender] : non tender [Depressed Mood] : not depressed

## 2021-01-10 NOTE — OB HISTORY
[___] : Total Pregnancies: [unfilled]
Pt BIBA from urgent care for weakness and SOB, pt found to be in rapid afib, unknown history, pt c/o pain to chest when moving which started last night, initial room air O2 84%, tested negative for covid at urgent care

## 2021-02-15 ENCOUNTER — TRANSCRIPTION ENCOUNTER (OUTPATIENT)
Age: 31
End: 2021-02-15

## 2021-02-24 ENCOUNTER — TRANSCRIPTION ENCOUNTER (OUTPATIENT)
Age: 31
End: 2021-02-24

## 2021-03-25 ENCOUNTER — TRANSCRIPTION ENCOUNTER (OUTPATIENT)
Age: 31
End: 2021-03-25

## 2021-04-07 ENCOUNTER — TRANSCRIPTION ENCOUNTER (OUTPATIENT)
Age: 31
End: 2021-04-07

## 2021-04-11 ENCOUNTER — TRANSCRIPTION ENCOUNTER (OUTPATIENT)
Age: 31
End: 2021-04-11

## 2021-06-21 ENCOUNTER — NON-APPOINTMENT (OUTPATIENT)
Age: 31
End: 2021-06-21

## 2021-06-21 ENCOUNTER — APPOINTMENT (OUTPATIENT)
Dept: OBGYN | Facility: CLINIC | Age: 31
End: 2021-06-21
Payer: COMMERCIAL

## 2021-06-21 VITALS — TEMPERATURE: 97.8 F | WEIGHT: 156 LBS | HEIGHT: 63 IN | BODY MASS INDEX: 27.64 KG/M2

## 2021-06-21 DIAGNOSIS — N80.9 ENDOMETRIOSIS, UNSPECIFIED: ICD-10-CM

## 2021-06-21 DIAGNOSIS — Z20.2 CONTACT WITH AND (SUSPECTED) EXPOSURE TO INFECTIONS WITH A PREDOMINANTLY SEXUAL MODE OF TRANSMISSION: ICD-10-CM

## 2021-06-21 DIAGNOSIS — Z97.5 PRESENCE OF (INTRAUTERINE) CONTRACEPTIVE DEVICE: ICD-10-CM

## 2021-06-21 DIAGNOSIS — Z87.42 PERSONAL HISTORY OF OTHER DISEASES OF THE FEMALE GENITAL TRACT: ICD-10-CM

## 2021-06-21 DIAGNOSIS — Z87.2 PERSONAL HISTORY OF DISEASES OF THE SKIN AND SUBCUTANEOUS TISSUE: ICD-10-CM

## 2021-06-21 DIAGNOSIS — Z30.430 ENCOUNTER FOR INSERTION OF INTRAUTERINE CONTRACEPTIVE DEVICE: ICD-10-CM

## 2021-06-21 PROCEDURE — 99395 PREV VISIT EST AGE 18-39: CPT

## 2021-06-21 RX ORDER — METRONIDAZOLE 7.5 MG/G
0.75 GEL VAGINAL
Qty: 1 | Refills: 0 | Status: DISCONTINUED | COMMUNITY
Start: 2020-12-14 | End: 2021-06-21

## 2021-06-21 RX ORDER — LEVONORGESTREL 19.5 MG/1
19.5 INTRAUTERINE DEVICE INTRAUTERINE
Qty: 1 | Refills: 0 | Status: COMPLETED | OUTPATIENT
Start: 2020-06-22 | End: 2020-06-23

## 2021-06-22 PROBLEM — N80.9 ENDOMETRIOSIS: Status: ACTIVE | Noted: 2020-03-02

## 2021-06-22 PROBLEM — Z97.5 IUD CONTRACEPTION: Status: ACTIVE | Noted: 2020-09-16

## 2021-06-29 ENCOUNTER — NON-APPOINTMENT (OUTPATIENT)
Age: 31
End: 2021-06-29

## 2021-06-29 LAB
A VAGINAE DNA VAG QL NAA+PROBE: ABNORMAL
BVAB2 DNA VAG QL NAA+PROBE: ABNORMAL
C KRUSEI DNA VAG QL NAA+PROBE: NEGATIVE
C TRACH RRNA SPEC QL NAA+PROBE: NEGATIVE
MEGA1 DNA VAG QL NAA+PROBE: NORMAL
N GONORRHOEA RRNA SPEC QL NAA+PROBE: NEGATIVE
T VAGINALIS RRNA SPEC QL NAA+PROBE: NEGATIVE

## 2021-06-29 RX ORDER — METRONIDAZOLE 7.5 MG/G
0.75 GEL VAGINAL
Qty: 1 | Refills: 3 | Status: ACTIVE | COMMUNITY
Start: 2021-06-29 | End: 1900-01-01

## 2022-08-30 ENCOUNTER — APPOINTMENT (OUTPATIENT)
Dept: OBGYN | Facility: CLINIC | Age: 32
End: 2022-08-30

## 2022-08-30 ENCOUNTER — TRANSCRIPTION ENCOUNTER (OUTPATIENT)
Age: 32
End: 2022-08-30

## 2022-08-30 VITALS — HEIGHT: 55 IN | BODY MASS INDEX: 37.03 KG/M2 | TEMPERATURE: 98 F | WEIGHT: 160 LBS

## 2022-08-30 DIAGNOSIS — H10.10 ACUTE ATOPIC CONJUNCTIVITIS, UNSPECIFIED EYE: ICD-10-CM

## 2022-08-30 LAB
BILIRUB UR QL STRIP: NORMAL
GLUCOSE UR-MCNC: NORMAL
HCG UR QL: 0.2 EU/DL
HGB UR QL STRIP.AUTO: NORMAL
KETONES UR-MCNC: NORMAL
LEUKOCYTE ESTERASE UR QL STRIP: NORMAL
NITRITE UR QL STRIP: NORMAL
PH UR STRIP: 6
PROT UR STRIP-MCNC: NORMAL
SP GR UR STRIP: 1.02

## 2022-08-30 PROCEDURE — 99395 PREV VISIT EST AGE 18-39: CPT

## 2022-08-30 PROCEDURE — 76830 TRANSVAGINAL US NON-OB: CPT

## 2022-08-30 PROCEDURE — 81003 URINALYSIS AUTO W/O SCOPE: CPT | Mod: QW

## 2022-08-30 NOTE — PHYSICAL EXAM
[Appropriately responsive] : appropriately responsive [Alert] : alert [No Acute Distress] : no acute distress [No Lymphadenopathy] : no lymphadenopathy [Regular Rate Rhythm] : regular rate rhythm [No Murmurs] : no murmurs [Clear to Auscultation B/L] : clear to auscultation bilaterally [Soft] : soft [Non-tender] : non-tender [Non-distended] : non-distended [No HSM] : No HSM [No Lesions] : no lesions [No Mass] : no mass [Oriented x3] : oriented x3 [Examination Of The Breasts] : a normal appearance [No Masses] : no breast masses were palpable [Labia Majora] : normal [Labia Minora] : normal [Normal] : normal [Anteversion] : anteverted [Mass ___ cm] : a [unfilled] ~Ucm uterine mass was palpated [Uterine Adnexae] : normal [FreeTextEntry4] : no nodularity in cul de sac [FreeTextEntry5] : IUD strings not visualized [FreeTextEntry6] : mild tenderness to palpation of fundus

## 2022-08-30 NOTE — HISTORY OF PRESENT ILLNESS
[FreeTextEntry1] : 32 yo P0 w/ LMP 2yrs ago here for annual exam. Patient has history of endometriosis since early teen years. Took OCPs several times. Had diagnostic lap with endo removal with GYN at [facility] in 2019. Pain improved since then. Avtarena placed 2020. Reports Increasing pain for the past 1.5years. No menses, but has painful cramping q21 days lasting for 7 days with 2-3 days of severe pain. Does not take pain meds. Not currently sexually active.\par \par Last pap 2020 NILM\par s/p Gardasil\par \par Ob hx: P0\par Gyn hx: fundal fibroid, stage 2 endometriosis dx on laparoscopy 2019\par PMH: asthma\par med: inhaler\par NKDA \par PSH laparoscopy with removal of endometriosis\par social: denies\par Fam hx: sister with endometriosis

## 2022-09-01 LAB
C TRACH RRNA SPEC QL NAA+PROBE: NOT DETECTED
HBV SURFACE AG SER QL: NONREACTIVE
HCV AB SER QL: NONREACTIVE
HCV S/CO RATIO: 0.26 S/CO
HIV1+2 AB SPEC QL IA.RAPID: NONREACTIVE
HPV HIGH+LOW RISK DNA PNL CVX: NOT DETECTED
N GONORRHOEA RRNA SPEC QL NAA+PROBE: NOT DETECTED
SOURCE AMPLIFICATION: NORMAL
T PALLIDUM AB SER QL IA: NEGATIVE

## 2022-09-13 DIAGNOSIS — N76.0 ACUTE VAGINITIS: ICD-10-CM

## 2022-09-13 DIAGNOSIS — B96.89 ACUTE VAGINITIS: ICD-10-CM

## 2022-09-13 LAB — CYTOLOGY CVX/VAG DOC THIN PREP: ABNORMAL

## 2022-09-13 RX ORDER — METRONIDAZOLE 500 MG/1
500 TABLET ORAL
Qty: 14 | Refills: 0 | Status: ACTIVE | COMMUNITY
Start: 2022-09-13 | End: 1900-01-01

## 2022-10-18 ENCOUNTER — EMERGENCY (EMERGENCY)
Facility: HOSPITAL | Age: 32
LOS: 0 days | Discharge: HOME | End: 2022-10-19
Attending: EMERGENCY MEDICINE | Admitting: EMERGENCY MEDICINE

## 2022-10-18 VITALS
SYSTOLIC BLOOD PRESSURE: 143 MMHG | OXYGEN SATURATION: 99 % | HEART RATE: 115 BPM | DIASTOLIC BLOOD PRESSURE: 77 MMHG | RESPIRATION RATE: 18 BRPM | WEIGHT: 160.06 LBS | TEMPERATURE: 99 F | HEIGHT: 63 IN

## 2022-10-18 DIAGNOSIS — J45.901 UNSPECIFIED ASTHMA WITH (ACUTE) EXACERBATION: ICD-10-CM

## 2022-10-18 DIAGNOSIS — Z97.5 PRESENCE OF (INTRAUTERINE) CONTRACEPTIVE DEVICE: ICD-10-CM

## 2022-10-18 DIAGNOSIS — R06.2 WHEEZING: ICD-10-CM

## 2022-10-18 DIAGNOSIS — R50.9 FEVER, UNSPECIFIED: ICD-10-CM

## 2022-10-18 DIAGNOSIS — Z20.822 CONTACT WITH AND (SUSPECTED) EXPOSURE TO COVID-19: ICD-10-CM

## 2022-10-18 DIAGNOSIS — R11.0 NAUSEA: ICD-10-CM

## 2022-10-18 DIAGNOSIS — J10.1 INFLUENZA DUE TO OTHER IDENTIFIED INFLUENZA VIRUS WITH OTHER RESPIRATORY MANIFESTATIONS: ICD-10-CM

## 2022-10-18 DIAGNOSIS — R05.9 COUGH, UNSPECIFIED: ICD-10-CM

## 2022-10-18 DIAGNOSIS — Z88.6 ALLERGY STATUS TO ANALGESIC AGENT: ICD-10-CM

## 2022-10-18 PROCEDURE — 99284 EMERGENCY DEPT VISIT MOD MDM: CPT

## 2022-10-18 NOTE — ED ADULT TRIAGE NOTE - CHIEF COMPLAINT QUOTE
pt sts I have been coughing since last night and felt like she couldn't catch her breath. also sts she has been exposed to someone with the flu and wants to make sure she doesn't have the flu

## 2022-10-19 VITALS — TEMPERATURE: 100 F | RESPIRATION RATE: 19 BRPM | HEART RATE: 96 BPM | OXYGEN SATURATION: 99 %

## 2022-10-19 LAB
FLUAV AG NPH QL: DETECTED
FLUBV AG NPH QL: SIGNIFICANT CHANGE UP
RSV RNA NPH QL NAA+NON-PROBE: SIGNIFICANT CHANGE UP
SARS-COV-2 RNA SPEC QL NAA+PROBE: SIGNIFICANT CHANGE UP

## 2022-10-19 RX ORDER — IPRATROPIUM/ALBUTEROL SULFATE 18-103MCG
3 AEROSOL WITH ADAPTER (GRAM) INHALATION ONCE
Refills: 0 | Status: COMPLETED | OUTPATIENT
Start: 2022-10-19 | End: 2022-10-19

## 2022-10-19 RX ORDER — ONDANSETRON 8 MG/1
4 TABLET, FILM COATED ORAL ONCE
Refills: 0 | Status: COMPLETED | OUTPATIENT
Start: 2022-10-19 | End: 2022-10-19

## 2022-10-19 RX ADMIN — Medication 3 MILLILITER(S): at 00:50

## 2022-10-19 RX ADMIN — Medication 50 MILLIGRAM(S): at 01:30

## 2022-10-19 RX ADMIN — ONDANSETRON 4 MILLIGRAM(S): 8 TABLET, FILM COATED ORAL at 01:58

## 2022-10-19 NOTE — ED PROVIDER NOTE - CARE PLAN
1 Principal Discharge DX:	Asthma exacerbation   Principal Discharge DX:	Asthma exacerbation  Secondary Diagnosis:	Influenza in adult

## 2022-10-19 NOTE — ED PROVIDER NOTE - PATIENT PORTAL LINK FT
You can access the FollowMyHealth Patient Portal offered by [facility] by registering at the following website: [email or web address]. By joining [employer]’s FollowMyHealth portal, you will also be able to view your health information using other applications (apps) compatible with our system.

## 2022-10-19 NOTE — ED PROVIDER NOTE - PHYSICAL EXAMINATION
CONSTITUTIONAL: Well-appearing; well-nourished; in no apparent distress.   EYES: PERRL; EOM intact.   ENT: normal nose; no rhinorrhea; normal pharynx with no tonsillar hypertrophy.   NECK: Supple; non-tender; no cervical lymphadenopathy.  CARDIOVASCULAR: Normal S1, S2; no murmurs, rubs, or gallops.   RESPIRATORY: No hypoxia. Pt speaking full sentences. No retractions. Normal chest excursion with respiration; breath sounds clear and equal bilaterally; no wheezes, rhonchi, or rales.  GI/: Normal bowel sounds; non-distended; non-tender; no palpable organomegaly.   MS: No evidence of trauma or deformity. Normal ROM in all four extremities; non-tender to palpation; distal pulses are normal.   Extrem: no peripheral edema. No calf ttp  SKIN: Normal for age and race; warm; dry; good turgor; no apparent lesions or exudate.   NEURO/PSYCH: A & O x 4; grossly unremarkable. mood and manner are appropriate.

## 2022-10-19 NOTE — ED PROVIDER NOTE - ATTENDING APP SHARED VISIT CONTRIBUTION OF CARE
31-year-old female with past medical history of asthma (prior admissions no prior ICU stays or intubations), endometriosis, IUD placement (therefore does not get periods)  presents to the ER for 1 day of cough and wheezing. She states she feels like she is having an asthma exacerbation.  Positive subjective fever and nausea.  States she was exposed to someone at work recently tested positive for flu.  Denies any associated chest pain, headache, sore throat, leg pain or swelling, recent travel, abdominal pain, diarrhea, body aches.     Exam +mild tachy 115, Temp 99.3 F, sats 99% RA, RR  18 per PA note. Pt does not appear to be in severe respiratory distress but is coughing actively. NCAT, PERRL, EOMI. Lungs CTAB, Heart Reg S1S2, Abdomen soft nt/nd +BS, no mass, Ext no edema or calf tenderness, distal pulses intact    A/P viral infection triggering asthma exacerbation. Will give nebs, steroids, antiemetics, check flu/covid swab, and reassess    ALL: ibuprofen-->asthma exacerbation  Meds Symbicort, Albuterol  SH no smoking, no etoh  PMD [place]  Pharmacy [facility]

## 2022-10-19 NOTE — ED PROVIDER NOTE - NS ED ROS FT
Constitutional: + subjective fever. No change in appetite or malaise  Eyes: no redness/discharge/pain/vision changes  ENT: no rhinorrhea/ear pain/sore throat  Cardiac: No chest pain or edema.  Respiratory: + cough. No respiratory distress  GI: + nausea. No vomiting, diarrhea or abdominal pain.  : No dysuria, frequency, urgency or hematuria  MS: no pain to back or extremities, no loss of ROM, no weakness  Neuro: No headache or weakness. No LOC.  Skin: No skin rash.  Endocrine: No history of thyroid disease or diabetes.  Except as documented in the HPI, all other systems are negative.

## 2022-10-19 NOTE — ED PROVIDER NOTE - CLINICAL SUMMARY MEDICAL DECISION MAKING FREE TEXT BOX
31-year-old female with past medical history of asthma (prior admissions no prior ICU stays or intubations), endometriosis, IUD placement (therefore does not get periods)  presents to the ER for 1 day of cough and wheezing. She states she feels like she is having an asthma exacerbation.  Positive subjective fever and nausea.  States she was exposed to someone at work recently tested positive for flu.  Denies any associated chest pain, headache, sore throat, leg pain or swelling, recent travel, abdominal pain, diarrhea, body aches. Pt improving after treatments. Feels easier to breath. To dc with steroids. Flu Swab +for influenza A. Supportive care at home, return for worsening

## 2022-10-19 NOTE — ED ADULT NURSE NOTE - OBJECTIVE STATEMENT
pt c/o asthma exacerbation states her chest has been feeling tight   recent sick contact, exposure to flu a   denies fevers

## 2022-10-19 NOTE — ED PROVIDER NOTE - CARE PROVIDER_API CALL
Júnior Bell)  Critical Care Medicine; Internal Medicine; Pulmonary Disease; Sleep Medicine  [address] [place] [address]  Phone: [phone]  Fax: [phone]  Follow Up Time:

## 2022-10-19 NOTE — ED PROVIDER NOTE - OBJECTIVE STATEMENT
31-year-old female with past medical history of asthma (prior admissions no prior ICU stays or intubations) complaining of 1 day of cough and wheezing states she feels like she is having an asthma exacerbation.  Positive subjective fever and nausea.  States someone at work recently tested positive for flu.  Denies any associated chest pain, headache, sore throat, leg pain or swelling, recent travel, abdominal pain, diarrhea, body aches

## 2022-10-19 NOTE — ED PROVIDER NOTE - NS ED ATTENDING STATEMENT MOD
This was a shared visit with the CONSTANCE. I reviewed and verified the documentation and independently performed the documented:

## 2023-04-05 ENCOUNTER — APPOINTMENT (OUTPATIENT)
Dept: OBGYN | Facility: CLINIC | Age: 33
End: 2023-04-05
Payer: COMMERCIAL

## 2023-04-05 VITALS — HEIGHT: 63 IN | BODY MASS INDEX: 25.87 KG/M2 | WEIGHT: 146 LBS

## 2023-04-05 DIAGNOSIS — B37.9 CANDIDIASIS, UNSPECIFIED: ICD-10-CM

## 2023-04-05 PROCEDURE — 99213 OFFICE O/P EST LOW 20 MIN: CPT

## 2023-04-05 RX ORDER — CLOTRIMAZOLE AND BETAMETHASONE DIPROPIONATE 10; .5 MG/G; MG/G
1-0.05 CREAM TOPICAL TWICE DAILY
Qty: 1 | Refills: 1 | Status: ACTIVE | COMMUNITY
Start: 2023-04-05 | End: 1900-01-01

## 2023-04-05 RX ORDER — FLUCONAZOLE 150 MG/1
150 TABLET ORAL
Qty: 2 | Refills: 2 | Status: ACTIVE | COMMUNITY
Start: 2023-04-05 | End: 1900-01-01

## 2023-04-05 NOTE — DISCUSSION/SUMMARY
[FreeTextEntry1] : 31 yo G0, with likely yeast infection. \par \par - f/up vaginitis and STI screening\par - PO diflucan and lotrisone sent to pharmacy\par - discussed avoiding and scented products. Can try probiotics.\par - return to office 9/2023 for annual exam or PRN

## 2023-04-05 NOTE — HISTORY OF PRESENT ILLNESS
[FreeTextEntry1] : 31 yo P0 w/ LMP prior to Kyleena insertion here for vaginal complaints. Has had thick/white discharge and itching for the past few days. Also having external irritation by clitoral area. No additional symptoms. She was also recently sexually active with ex-boyfriend and wants to be screened for STIs\par leaving for [place] [employer] in 2 days\par Works for [employer]

## 2023-04-13 LAB
A VAGINAE DNA VAG QL NAA+PROBE: NORMAL
BVAB2 DNA VAG QL NAA+PROBE: NORMAL
C KRUSEI DNA VAG QL NAA+PROBE: NEGATIVE
C KRUSEI DNA VAG QL NAA+PROBE: POSITIVE
C TRACH RRNA SPEC QL NAA+PROBE: NEGATIVE
MEGA1 DNA VAG QL NAA+PROBE: NORMAL
N GONORRHOEA RRNA SPEC QL NAA+PROBE: NEGATIVE
T VAGINALIS RRNA SPEC QL NAA+PROBE: NEGATIVE

## 2023-05-11 NOTE — PHYSICAL EXAM
[Awake] : awake [Alert] : alert [Soft] : soft [Oriented x3] : oriented to person, place, and time [Normal] : uterus [IUD String] : had an IUD string protruding out [Pap Obtained] : a Pap smear was performed [Uterine Adnexae] : were not tender and not enlarged [RRR, No Murmurs] : RRR, no murmurs [CTAB] : CTAB [Acute Distress] : no acute distress [LAD] : no lymphadenopathy [Thyroid Nodule] : no thyroid nodule [Goiter] : no goiter [Mass] : no breast mass [Nipple Discharge] : no nipple discharge [Axillary LAD] : no axillary lymphadenopathy [Tender] : non tender [Distended] : not distended [Depressed Mood] : not depressed V-Y Flap Text: The defect edges were debeveled with a #15 scalpel blade.  Given the location of the defect, shape of the defect and the proximity to free margins a V-Y flap was deemed most appropriate.  Using a sterile surgical marker, an appropriate advancement flap was drawn incorporating the defect and placing the expected incisions within the relaxed skin tension lines where possible.    The area thus outlined was incised deep to adipose tissue with a #15 scalpel blade.  The skin margins were undermined to an appropriate distance in all directions.

## 2023-09-20 ENCOUNTER — APPOINTMENT (OUTPATIENT)
Dept: OBGYN | Facility: CLINIC | Age: 33
End: 2023-09-20
Payer: COMMERCIAL

## 2023-09-20 VITALS — HEIGHT: 63 IN | BODY MASS INDEX: 26.58 KG/M2 | WEIGHT: 150 LBS

## 2023-09-20 DIAGNOSIS — Z01.419 ENCOUNTER FOR GYNECOLOGICAL EXAMINATION (GENERAL) (ROUTINE) W/OUT ABNORMAL FINDINGS: ICD-10-CM

## 2023-09-20 LAB
BILIRUB UR QL STRIP: NEGATIVE
GLUCOSE UR-MCNC: NEGATIVE
HCG UR QL: 0.2 EU/DL
HGB UR QL STRIP.AUTO: NORMAL
KETONES UR-MCNC: NORMAL
LEUKOCYTE ESTERASE UR QL STRIP: NEGATIVE
NITRITE UR QL STRIP: NEGATIVE
PH UR STRIP: 6
PROT UR STRIP-MCNC: NEGATIVE
SP GR UR STRIP: 1.01

## 2023-09-20 PROCEDURE — 99395 PREV VISIT EST AGE 18-39: CPT

## 2023-09-20 PROCEDURE — 81003 URINALYSIS AUTO W/O SCOPE: CPT | Mod: QW

## 2023-09-20 RX ORDER — CLOTRIMAZOLE AND BETAMETHASONE DIPROPIONATE 10; .5 MG/G; MG/G
1-0.05 CREAM TOPICAL TWICE DAILY
Qty: 1 | Refills: 0 | Status: ACTIVE | COMMUNITY
Start: 2023-09-20 | End: 1900-01-01

## 2023-09-20 RX ORDER — TERCONAZOLE 8 MG/G
0.8 CREAM VAGINAL
Qty: 1 | Refills: 2 | Status: ACTIVE | COMMUNITY
Start: 2023-09-20 | End: 1900-01-01

## 2023-09-23 LAB — HPV HIGH+LOW RISK DNA PNL CVX: NOT DETECTED

## 2023-09-26 LAB
HBV SURFACE AG SER QL: NONREACTIVE
HCV AB SER QL: NONREACTIVE
HCV S/CO RATIO: 0.23 S/CO
HIV1+2 AB SPEC QL IA.RAPID: NONREACTIVE
T PALLIDUM AB SER QL IA: NEGATIVE

## 2023-09-27 LAB — CYTOLOGY CVX/VAG DOC THIN PREP: NORMAL

## 2023-12-31 PROBLEM — Z20.2 POSSIBLE EXPOSURE TO STD: Status: RESOLVED | Noted: 2018-10-24 | Resolved: 2021-06-21

## 2024-03-04 ENCOUNTER — APPOINTMENT (OUTPATIENT)
Dept: ORTHOPEDIC SURGERY | Facility: CLINIC | Age: 34
End: 2024-03-04
Payer: COMMERCIAL

## 2024-03-04 VITALS — HEIGHT: 63 IN | BODY MASS INDEX: 26.58 KG/M2 | WEIGHT: 150 LBS

## 2024-03-04 DIAGNOSIS — M79.89 OTHER SPECIFIED SOFT TISSUE DISORDERS: ICD-10-CM

## 2024-03-04 PROCEDURE — 99203 OFFICE O/P NEW LOW 30 MIN: CPT

## 2024-03-04 PROCEDURE — 73140 X-RAY EXAM OF FINGER(S): CPT | Mod: LT

## 2024-03-04 NOTE — PHYSICAL EXAM
[Left] : left hand [MCP Joint] : MCP joint [Middle Phalanx] : middle phalanx [Proximal Phalanx] : proximal phalanx [MP Joint] : MP joint [2nd] : 2nd [PIP Joint] : PIP joint [DIP Joint] : DIP joint [FreeTextEntry3] : Swelling of entire index finger, mild.  No deformity, no laceration/abrasion, no erythema or ecchymosis [] : no instability w/varus/valgus or ant/post stressing of fingers joints [de-identified] : Good strength

## 2024-03-04 NOTE — DISCUSSION/SUMMARY
[de-identified] : Blood work ordered for further evaluation of nontraumatic swelling and pain to the finger.  Advised patient to call office after blood work to discuss results in detail and further treatment/follow-up.  Tylenol for pain.  Discussed possibility of steroid for inflammation if worsens.  Patient has allergy to ibuprofen.  Patient understands agrees with plan.

## 2024-03-04 NOTE — HISTORY OF PRESENT ILLNESS
[de-identified] : Patient is a 33-year-old female here for evaluation of left index finger.  Patient states over the past 2 weeks she has been noticing pain and swelling to her left index finger that has been worsening.  Patient denies any recent injury/trauma, numbness/tingling, instability.  Patient states it hurts when she moves the finger.

## 2024-03-20 ENCOUNTER — APPOINTMENT (OUTPATIENT)
Dept: ORTHOPEDIC SURGERY | Facility: CLINIC | Age: 34
End: 2024-03-20

## 2024-09-26 ENCOUNTER — APPOINTMENT (OUTPATIENT)
Dept: OBGYN | Facility: CLINIC | Age: 34
End: 2024-09-26
Payer: COMMERCIAL

## 2024-09-26 VITALS — HEIGHT: 63 IN | BODY MASS INDEX: 29.59 KG/M2 | WEIGHT: 167 LBS

## 2024-09-26 DIAGNOSIS — Z01.419 ENCOUNTER FOR GYNECOLOGICAL EXAMINATION (GENERAL) (ROUTINE) W/OUT ABNORMAL FINDINGS: ICD-10-CM

## 2024-09-26 DIAGNOSIS — Z00.00 ENCOUNTER FOR GENERAL ADULT MEDICAL EXAMINATION W/OUT ABNORMAL FINDINGS: ICD-10-CM

## 2024-09-26 LAB
APPEARANCE: CLEAR
BILIRUBIN URINE: NEGATIVE
BLOOD URINE: ABNORMAL
COLOR: YELLOW
GLUCOSE QUALITATIVE U: NEGATIVE
KETONES URINE: 40
LEUKOCYTE ESTERASE URINE: NEGATIVE
NITRITE URINE: NEGATIVE
PH URINE: 7
PROTEIN URINE: NEGATIVE
SPECIFIC GRAVITY URINE: 1.02
UROBILINOGEN URINE: 0.2 (ref 0.2–?)

## 2024-09-26 PROCEDURE — 99395 PREV VISIT EST AGE 18-39: CPT

## 2024-09-26 NOTE — HISTORY OF PRESENT ILLNESS
[FreeTextEntry1] : 34 yo G0 here for annual exam. Has had Kylenna IUD since 6/2020 and is happy with this method of contraception. Has rare light spotting, no pain. Had yeast infection recently, feels symptoms are improving. No abdominal pain or urinary complaints. No current discharge or odor. Not sexually ative at this time.  Works as  in [employer]  Last pap 2023 NILM, HPV neg

## 2024-09-26 NOTE — DISCUSSION/SUMMARY
[FreeTextEntry1] : 34 yo G0, Keshawnna IUD, annual exam.  - f/up pap, HPV, vaginitis - STI screening ordered - call with results - discussed possible suppression therapy for recurring yeast infection - aware strings are not visible - Kyleena to be removed 6/2025 - return to office IUD removal or PRN

## 2024-09-30 LAB — HPV HIGH+LOW RISK DNA PNL CVX: NOT DETECTED

## 2024-10-01 ENCOUNTER — NON-APPOINTMENT (OUTPATIENT)
Age: 34
End: 2024-10-01

## 2024-10-01 LAB
A VAGINAE DNA VAG QL NAA+PROBE: NORMAL
BVAB2 DNA VAG QL NAA+PROBE: NORMAL
C KRUSEI DNA VAG QL NAA+PROBE: NEGATIVE
C KRUSEI DNA VAG QL NAA+PROBE: POSITIVE
C TRACH RRNA SPEC QL NAA+PROBE: NEGATIVE
CANDIDA DNA VAG QL NAA+PROBE: NEGATIVE
MEGA1 DNA VAG QL NAA+PROBE: NORMAL
N GONORRHOEA RRNA SPEC QL NAA+PROBE: NEGATIVE
T VAGINALIS RRNA SPEC QL NAA+PROBE: NEGATIVE

## 2024-10-02 RX ORDER — FLUCONAZOLE 150 MG/1
150 TABLET ORAL
Qty: 2 | Refills: 0 | Status: ACTIVE | COMMUNITY
Start: 2024-10-02 | End: 1900-01-01

## 2024-10-03 LAB — CYTOLOGY CVX/VAG DOC THIN PREP: ABNORMAL

## 2025-08-13 RX ORDER — MISOPROSTOL 200 UG/1
200 TABLET ORAL
Qty: 2 | Refills: 0 | Status: ACTIVE | COMMUNITY
Start: 2025-08-13 | End: 1900-01-01

## 2025-08-13 RX ORDER — FLUCONAZOLE 150 MG/1
150 TABLET ORAL
Qty: 2 | Refills: 2 | Status: ACTIVE | COMMUNITY
Start: 2025-08-13 | End: 1900-01-01

## 2025-08-15 ENCOUNTER — APPOINTMENT (OUTPATIENT)
Dept: OBGYN | Facility: CLINIC | Age: 35
End: 2025-08-15
Payer: COMMERCIAL

## 2025-08-15 VITALS — BODY MASS INDEX: 28.35 KG/M2 | HEIGHT: 63 IN | WEIGHT: 160 LBS

## 2025-08-15 DIAGNOSIS — Z97.5 PRESENCE OF (INTRAUTERINE) CONTRACEPTIVE DEVICE: ICD-10-CM

## 2025-08-15 LAB
APPEARANCE: CLEAR
BILIRUBIN URINE: NEGATIVE
BLOOD URINE: NEGATIVE
COLOR: YELLOW
GLUCOSE QUALITATIVE U: NEGATIVE
KETONES URINE: NEGATIVE
LEUKOCYTE ESTERASE URINE: ABNORMAL
NITRITE URINE: NEGATIVE
PH URINE: 6.5
PROTEIN URINE: NEGATIVE
SPECIFIC GRAVITY URINE: 1.02
UROBILINOGEN URINE: 0.2 (ref 0.2–?)

## 2025-08-15 PROCEDURE — 58301 REMOVE INTRAUTERINE DEVICE: CPT

## 2025-08-15 PROCEDURE — 58300 INSERT INTRAUTERINE DEVICE: CPT

## 2025-09-11 ENCOUNTER — APPOINTMENT (OUTPATIENT)
Dept: OBGYN | Facility: CLINIC | Age: 35
End: 2025-09-11